# Patient Record
Sex: MALE | URBAN - METROPOLITAN AREA
[De-identification: names, ages, dates, MRNs, and addresses within clinical notes are randomized per-mention and may not be internally consistent; named-entity substitution may affect disease eponyms.]

---

## 2018-06-07 ENCOUNTER — APPOINTMENT (RX ONLY)
Dept: URBAN - METROPOLITAN AREA CLINIC 123 | Facility: CLINIC | Age: 63
Setting detail: DERMATOLOGY
End: 2018-06-07

## 2018-06-07 DIAGNOSIS — L57.0 ACTINIC KERATOSIS: ICD-10-CM

## 2018-06-07 DIAGNOSIS — L82.0 INFLAMED SEBORRHEIC KERATOSIS: ICD-10-CM

## 2018-06-07 PROBLEM — J30.1 ALLERGIC RHINITIS DUE TO POLLEN: Status: ACTIVE | Noted: 2018-06-07

## 2018-06-07 PROCEDURE — 99201: CPT | Mod: 25

## 2018-06-07 PROCEDURE — ? COUNSELING

## 2018-06-07 PROCEDURE — 17000 DESTRUCT PREMALG LESION: CPT

## 2018-06-07 PROCEDURE — ? SUNSCREEN RECOMMENDATIONS

## 2018-06-07 PROCEDURE — ? LIQUID NITROGEN

## 2018-06-07 ASSESSMENT — LOCATION SIMPLE DESCRIPTION DERM
LOCATION SIMPLE: LEFT SHOULDER
LOCATION SIMPLE: CHEST

## 2018-06-07 ASSESSMENT — LOCATION ZONE DERM
LOCATION ZONE: TRUNK
LOCATION ZONE: ARM

## 2018-06-07 ASSESSMENT — LOCATION DETAILED DESCRIPTION DERM
LOCATION DETAILED: LEFT LATERAL SUPERIOR CHEST
LOCATION DETAILED: LEFT LATERAL SHOULDER

## 2019-02-13 ENCOUNTER — APPOINTMENT (RX ONLY)
Dept: URBAN - METROPOLITAN AREA CLINIC 123 | Facility: CLINIC | Age: 64
Setting detail: DERMATOLOGY
End: 2019-02-13

## 2019-02-13 DIAGNOSIS — D22 MELANOCYTIC NEVI: ICD-10-CM

## 2019-02-13 DIAGNOSIS — L57.0 ACTINIC KERATOSIS: ICD-10-CM

## 2019-02-13 DIAGNOSIS — L90.5 SCAR CONDITIONS AND FIBROSIS OF SKIN: ICD-10-CM

## 2019-02-13 DIAGNOSIS — L81.5 LEUKODERMA, NOT ELSEWHERE CLASSIFIED: ICD-10-CM

## 2019-02-13 DIAGNOSIS — L82.1 OTHER SEBORRHEIC KERATOSIS: ICD-10-CM

## 2019-02-13 DIAGNOSIS — L81.4 OTHER MELANIN HYPERPIGMENTATION: ICD-10-CM

## 2019-02-13 DIAGNOSIS — D18.0 HEMANGIOMA: ICD-10-CM

## 2019-02-13 DIAGNOSIS — L73.8 OTHER SPECIFIED FOLLICULAR DISORDERS: ICD-10-CM

## 2019-02-13 PROBLEM — D22.5 MELANOCYTIC NEVI OF TRUNK: Status: ACTIVE | Noted: 2019-02-13

## 2019-02-13 PROBLEM — D18.01 HEMANGIOMA OF SKIN AND SUBCUTANEOUS TISSUE: Status: ACTIVE | Noted: 2019-02-13

## 2019-02-13 PROCEDURE — ? TREATMENT REGIMEN

## 2019-02-13 PROCEDURE — 17003 DESTRUCT PREMALG LES 2-14: CPT

## 2019-02-13 PROCEDURE — ? LIQUID NITROGEN

## 2019-02-13 PROCEDURE — 99214 OFFICE O/P EST MOD 30 MIN: CPT | Mod: 25

## 2019-02-13 PROCEDURE — 17000 DESTRUCT PREMALG LESION: CPT

## 2019-02-13 PROCEDURE — ? RECOMMENDATIONS

## 2019-02-13 PROCEDURE — ? PRESCRIPTION

## 2019-02-13 PROCEDURE — ? COUNSELING

## 2019-02-13 PROCEDURE — ? INVENTORY

## 2019-02-13 PROCEDURE — ? SUNSCREEN RECOMMENDATIONS

## 2019-02-13 RX ORDER — FLUOROURACIL 50 MG/G
CREAM TOPICAL
Qty: 1 | Refills: 1 | Status: ERX | COMMUNITY
Start: 2019-02-13

## 2019-02-13 RX ADMIN — FLUOROURACIL: 50 CREAM TOPICAL at 00:00

## 2019-02-13 ASSESSMENT — LOCATION SIMPLE DESCRIPTION DERM
LOCATION SIMPLE: LEFT THIGH
LOCATION SIMPLE: INFERIOR FOREHEAD
LOCATION SIMPLE: CHEST
LOCATION SIMPLE: ABDOMEN
LOCATION SIMPLE: LEFT PRETIBIAL REGION
LOCATION SIMPLE: LEFT FOREARM
LOCATION SIMPLE: LEFT UPPER BACK
LOCATION SIMPLE: RIGHT HAND
LOCATION SIMPLE: NOSE
LOCATION SIMPLE: SUPERIOR FOREHEAD

## 2019-02-13 ASSESSMENT — LOCATION ZONE DERM
LOCATION ZONE: FACE
LOCATION ZONE: NOSE
LOCATION ZONE: LEG
LOCATION ZONE: TRUNK
LOCATION ZONE: HAND
LOCATION ZONE: ARM

## 2019-02-13 ASSESSMENT — LOCATION DETAILED DESCRIPTION DERM
LOCATION DETAILED: INFERIOR MID FOREHEAD
LOCATION DETAILED: LEFT ANTERIOR LATERAL DISTAL THIGH
LOCATION DETAILED: NASAL TIP
LOCATION DETAILED: LEFT SUPERIOR MEDIAL UPPER BACK
LOCATION DETAILED: SUPERIOR MID FOREHEAD
LOCATION DETAILED: LEFT MID-UPPER BACK
LOCATION DETAILED: LEFT INFERIOR UPPER BACK
LOCATION DETAILED: LEFT LATERAL DISTAL PRETIBIAL REGION
LOCATION DETAILED: LEFT MEDIAL SUPERIOR CHEST
LOCATION DETAILED: EPIGASTRIC SKIN
LOCATION DETAILED: RIGHT RADIAL DORSAL HAND
LOCATION DETAILED: LEFT SUPERIOR UPPER BACK
LOCATION DETAILED: LEFT DISTAL DORSAL FOREARM

## 2019-02-13 NOTE — PROCEDURE: RECOMMENDATIONS
Detail Level: Zone
Recommendations (Free Text): Biocorneum
Recommendation Preamble: The following recommendations were made during the visit:

## 2019-02-13 NOTE — PROCEDURE: TREATMENT REGIMEN
Initiate Treatment: 5FU face BID x 2 weeks (avoid eyes/lips)
Plan: Recheck face in 6 weeks.  If lesion(s) still present, bx
Detail Level: Zone

## 2020-10-21 ENCOUNTER — APPOINTMENT (RX ONLY)
Dept: URBAN - METROPOLITAN AREA CLINIC 123 | Facility: CLINIC | Age: 65
Setting detail: DERMATOLOGY
End: 2020-10-21

## 2020-10-21 DIAGNOSIS — L72.0 EPIDERMAL CYST: ICD-10-CM

## 2020-10-21 DIAGNOSIS — L91.8 OTHER HYPERTROPHIC DISORDERS OF THE SKIN: ICD-10-CM

## 2020-10-21 DIAGNOSIS — L57.0 ACTINIC KERATOSIS: ICD-10-CM

## 2020-10-21 DIAGNOSIS — L82.0 INFLAMED SEBORRHEIC KERATOSIS: ICD-10-CM

## 2020-10-21 PROBLEM — D48.5 NEOPLASM OF UNCERTAIN BEHAVIOR OF SKIN: Status: ACTIVE | Noted: 2020-10-21

## 2020-10-21 PROCEDURE — ? FULL BODY SKIN EXAM - DECLINED

## 2020-10-21 PROCEDURE — 17110 DESTRUCTION B9 LES UP TO 14: CPT

## 2020-10-21 PROCEDURE — 11102 TANGNTL BX SKIN SINGLE LES: CPT | Mod: 59

## 2020-10-21 PROCEDURE — ? DEFER

## 2020-10-21 PROCEDURE — 99213 OFFICE O/P EST LOW 20 MIN: CPT | Mod: 25

## 2020-10-21 PROCEDURE — ? LIQUID NITROGEN

## 2020-10-21 PROCEDURE — ? COUNSELING

## 2020-10-21 PROCEDURE — ? BIOPSY BY SHAVE METHOD

## 2020-10-21 PROCEDURE — ? ADDITIONAL NOTES

## 2020-10-21 ASSESSMENT — LOCATION DETAILED DESCRIPTION DERM
LOCATION DETAILED: LEFT MEDIAL FOREHEAD
LOCATION DETAILED: RIGHT LATERAL FOREHEAD
LOCATION DETAILED: LEFT LATERAL SHOULDER
LOCATION DETAILED: RIGHT SUPERIOR LATERAL SCROTUM
LOCATION DETAILED: RIGHT AXILLARY VAULT

## 2020-10-21 ASSESSMENT — LOCATION ZONE DERM
LOCATION ZONE: SCROTUM
LOCATION ZONE: AXILLAE
LOCATION ZONE: ARM
LOCATION ZONE: FACE

## 2020-10-21 ASSESSMENT — LOCATION SIMPLE DESCRIPTION DERM
LOCATION SIMPLE: RIGHT SCROTUM
LOCATION SIMPLE: RIGHT AXILLARY VAULT
LOCATION SIMPLE: LEFT FOREHEAD
LOCATION SIMPLE: RIGHT FOREHEAD
LOCATION SIMPLE: LEFT SHOULDER

## 2020-10-21 NOTE — PROCEDURE: LIQUID NITROGEN
Post-Care Instructions: I reviewed with the patient in detail post-care instructions. Patient is to wear sunprotection, and avoid picking at any of the treated lesions. Pt may apply Vaseline to crusted or scabbing areas.
Render Post-Care Instructions In Note?: no
Medical Necessity Clause: This procedure was medically necessary because the lesions that were treated were:
Consent: The patient's consent was obtained including but not limited to risks of crusting, scabbing, blistering, scarring, darker or lighter pigmentary change, recurrence, incomplete removal and infection.
Detail Level: Detailed
Medical Necessity Information: It is in your best interest to select a reason for this procedure from the list below. All of these items fulfill various CMS LCD requirements except the new and changing color options.
Detail Level: Zone
Total Number Of Lesions Treated: 1

## 2020-10-21 NOTE — PROCEDURE: BIOPSY BY SHAVE METHOD
Biopsy Method: Dermablade post-procedure radiography performed central line located in the/post-procedure radiography performed/no pneumothorax

## 2020-11-10 ENCOUNTER — APPOINTMENT (RX ONLY)
Dept: URBAN - METROPOLITAN AREA CLINIC 123 | Facility: CLINIC | Age: 65
Setting detail: DERMATOLOGY
End: 2020-11-10

## 2020-11-10 PROBLEM — C44.619 BASAL CELL CARCINOMA OF SKIN OF LEFT UPPER LIMB, INCLUDING SHOULDER: Status: ACTIVE | Noted: 2020-11-10

## 2020-11-10 PROCEDURE — ? EXCISION

## 2020-11-10 PROCEDURE — 13121 CMPLX RPR S/A/L 2.6-7.5 CM: CPT

## 2020-11-10 PROCEDURE — 11602 EXC TR-EXT MAL+MARG 1.1-2 CM: CPT

## 2020-11-18 ENCOUNTER — APPOINTMENT (RX ONLY)
Dept: URBAN - METROPOLITAN AREA CLINIC 123 | Facility: CLINIC | Age: 65
Setting detail: DERMATOLOGY
End: 2020-11-18

## 2020-11-18 DIAGNOSIS — Z48.817 ENCOUNTER FOR SURGICAL AFTERCARE FOLLOWING SURGERY ON THE SKIN AND SUBCUTANEOUS TISSUE: ICD-10-CM

## 2020-11-18 PROCEDURE — ? POST-OP WOUND CHECK

## 2020-11-18 PROCEDURE — ? ADDITIONAL NOTES

## 2020-11-18 PROCEDURE — 99024 POSTOP FOLLOW-UP VISIT: CPT

## 2020-11-18 ASSESSMENT — LOCATION ZONE DERM: LOCATION ZONE: ARM

## 2020-11-18 ASSESSMENT — LOCATION DETAILED DESCRIPTION DERM: LOCATION DETAILED: LEFT LATERAL SHOULDER

## 2020-11-18 ASSESSMENT — LOCATION SIMPLE DESCRIPTION DERM: LOCATION SIMPLE: LEFT SHOULDER

## 2020-11-18 NOTE — PROCEDURE: POST-OP WOUND CHECK
Detail Level: Detailed
Add 41986 Cpt? (Important Note: In 2017 The Use Of 93976 Is Being Tracked By Cms To Determine Future Global Period Reimbursement For Global Periods): yes
Wound Evaluated By: Dr Garcia
Additional Comments: Per Dr Andrews, recommended to wait at least 12-14 days post-op before SRM (another 4-6 days)

## 2020-11-24 ENCOUNTER — APPOINTMENT (RX ONLY)
Dept: URBAN - METROPOLITAN AREA CLINIC 123 | Facility: CLINIC | Age: 65
Setting detail: DERMATOLOGY
End: 2020-11-24

## 2020-11-24 DIAGNOSIS — Z48.02 ENCOUNTER FOR REMOVAL OF SUTURES: ICD-10-CM

## 2020-11-24 PROCEDURE — ? SUTURE REMOVAL (GLOBAL PERIOD)

## 2020-11-24 PROCEDURE — 99024 POSTOP FOLLOW-UP VISIT: CPT

## 2020-11-24 ASSESSMENT — LOCATION SIMPLE DESCRIPTION DERM: LOCATION SIMPLE: LEFT SHOULDER

## 2020-11-24 ASSESSMENT — LOCATION ZONE DERM: LOCATION ZONE: ARM

## 2020-11-24 ASSESSMENT — LOCATION DETAILED DESCRIPTION DERM: LOCATION DETAILED: LEFT ANTERIOR SHOULDER

## 2020-11-24 NOTE — PROCEDURE: SUTURE REMOVAL (GLOBAL PERIOD)
Detail Level: Detailed
Add 62248 Cpt? (Important Note: In 2017 The Use Of 06126 Is Being Tracked By Cms To Determine Future Global Period Reimbursement For Global Periods): yes

## 2021-07-14 ENCOUNTER — APPOINTMENT (RX ONLY)
Dept: URBAN - METROPOLITAN AREA CLINIC 123 | Facility: CLINIC | Age: 66
Setting detail: DERMATOLOGY
End: 2021-07-14

## 2021-07-14 DIAGNOSIS — D22 MELANOCYTIC NEVI: ICD-10-CM

## 2021-07-14 DIAGNOSIS — L82.0 INFLAMED SEBORRHEIC KERATOSIS: ICD-10-CM

## 2021-07-14 PROBLEM — D48.5 NEOPLASM OF UNCERTAIN BEHAVIOR OF SKIN: Status: ACTIVE | Noted: 2021-07-14

## 2021-07-14 PROCEDURE — ? LIQUID NITROGEN

## 2021-07-14 PROCEDURE — ? FULL BODY SKIN EXAM - DECLINED

## 2021-07-14 PROCEDURE — 17110 DESTRUCTION B9 LES UP TO 14: CPT

## 2021-07-14 PROCEDURE — ? ADDITIONAL NOTES

## 2021-07-14 PROCEDURE — ? COUNSELING

## 2021-07-14 PROCEDURE — 11102 TANGNTL BX SKIN SINGLE LES: CPT | Mod: 59

## 2021-07-14 PROCEDURE — ? BIOPSY BY SHAVE METHOD

## 2021-07-14 ASSESSMENT — LOCATION ZONE DERM
LOCATION ZONE: ARM
LOCATION ZONE: LEG

## 2021-07-14 ASSESSMENT — LOCATION SIMPLE DESCRIPTION DERM
LOCATION SIMPLE: LEFT CALF
LOCATION SIMPLE: RIGHT FOREARM

## 2021-07-14 ASSESSMENT — LOCATION DETAILED DESCRIPTION DERM
LOCATION DETAILED: RIGHT PROXIMAL RADIAL DORSAL FOREARM
LOCATION DETAILED: LEFT PROXIMAL LATERAL CALF

## 2021-07-14 NOTE — PROCEDURE: LIQUID NITROGEN
Add 52 Modifier (Optional): no
Medical Necessity Information: It is in your best interest to select a reason for this procedure from the list below. All of these items fulfill various CMS LCD requirements except the new and changing color options.
Show Applicator Variable?: Yes
Medical Necessity Clause: This procedure was medically necessary because the lesions that were treated were:
Post-Care Instructions: I reviewed with the patient in detail post-care instructions. Patient is to wear sunprotection, and avoid picking at any of the treated lesions. Pt may apply Vaseline to crusted or scabbing areas.
Consent: The patient's consent was obtained including but not limited to risks of crusting, scabbing, blistering, scarring, darker or lighter pigmentary change, recurrence, incomplete removal and infection.
Detail Level: Detailed

## 2022-06-28 NOTE — PROCEDURE: DEFER
Detail Level: Detailed
Procedure To Be Performed At Next Visit: I&D
Introduction Text (Please End With A Colon): The following procedure was deferred:
Instructions (Optional): Pt refuses topical 5-FU
Procedure To Be Performed At Next Visit: PDT Red Light
No

## 2022-07-13 ENCOUNTER — APPOINTMENT (RX ONLY)
Dept: URBAN - METROPOLITAN AREA CLINIC 123 | Facility: CLINIC | Age: 67
Setting detail: DERMATOLOGY
End: 2022-07-13

## 2022-07-13 DIAGNOSIS — L82.0 INFLAMED SEBORRHEIC KERATOSIS: ICD-10-CM

## 2022-07-13 PROCEDURE — ? DEFER

## 2022-07-13 PROCEDURE — ? COUNSELING

## 2022-07-13 PROCEDURE — ? ADDITIONAL NOTES

## 2022-07-13 PROCEDURE — 17110 DESTRUCTION B9 LES UP TO 14: CPT

## 2022-07-13 PROCEDURE — ? LIQUID NITROGEN

## 2022-07-13 PROCEDURE — ? FULL BODY SKIN EXAM - DECLINED

## 2022-07-13 ASSESSMENT — LOCATION SIMPLE DESCRIPTION DERM: LOCATION SIMPLE: LEFT CHEEK

## 2022-07-13 ASSESSMENT — LOCATION DETAILED DESCRIPTION DERM: LOCATION DETAILED: LEFT LATERAL BUCCAL CHEEK

## 2022-07-13 ASSESSMENT — LOCATION ZONE DERM: LOCATION ZONE: FACE

## 2022-07-13 NOTE — PROCEDURE: LIQUID NITROGEN
Show Aperture Variable?: Yes
Detail Level: Detailed
Render Note In Bullet Format When Appropriate: No
Spray Paint Text: The liquid nitrogen was applied to the skin utilizing a spray paint frosting technique.
Post-Care Instructions: I reviewed with the patient in detail post-care instructions. Patient is to wear sunprotection, and avoid picking at any of the treated lesions. Pt may apply Vaseline to crusted or scabbing areas.
Medical Necessity Information: It is in your best interest to select a reason for this procedure from the list below. All of these items fulfill various CMS LCD requirements except the new and changing color options.
Consent: The patient's consent was obtained including but not limited to risks of crusting, scabbing, blistering, scarring, darker or lighter pigmentary change, recurrence, incomplete removal and infection.
Medical Necessity Clause: This procedure was medically necessary because the lesions that were treated were:

## 2022-07-13 NOTE — PROCEDURE: DEFER
Procedure To Be Performed At Next Visit: Biopsy by shave method
Introduction Text (Please End With A Colon): The following procedure was deferred:
Instructions (Optional): If lesion not 100% resolved or recurs s/p LN2, RTC for bx
Detail Level: Detailed